# Patient Record
Sex: MALE
[De-identification: names, ages, dates, MRNs, and addresses within clinical notes are randomized per-mention and may not be internally consistent; named-entity substitution may affect disease eponyms.]

---

## 2018-03-19 ENCOUNTER — RESULT REVIEW (OUTPATIENT)
Age: 63
End: 2018-03-19

## 2020-09-10 ENCOUNTER — RESULT REVIEW (OUTPATIENT)
Age: 65
End: 2020-09-10

## 2022-12-06 PROBLEM — Z00.00 ENCOUNTER FOR PREVENTIVE HEALTH EXAMINATION: Status: ACTIVE | Noted: 2022-12-06

## 2022-12-08 ENCOUNTER — APPOINTMENT (OUTPATIENT)
Dept: SURGERY | Facility: CLINIC | Age: 67
End: 2022-12-08

## 2022-12-08 VITALS
SYSTOLIC BLOOD PRESSURE: 162 MMHG | HEIGHT: 70 IN | WEIGHT: 210 LBS | OXYGEN SATURATION: 97 % | TEMPERATURE: 98.1 F | DIASTOLIC BLOOD PRESSURE: 89 MMHG | HEART RATE: 72 BPM | BODY MASS INDEX: 30.06 KG/M2

## 2022-12-08 DIAGNOSIS — K43.9 VENTRAL HERNIA W/OUT OBSTRUCTION OR GANGRENE: ICD-10-CM

## 2022-12-08 PROCEDURE — 99024 POSTOP FOLLOW-UP VISIT: CPT

## 2022-12-08 NOTE — ASSESSMENT
[FreeTextEntry1] : Patient is a 56 yo M referred to clinic by his PCP for evaluation of potential right sided abdominal wall hernia.\par \par PLAN: CT Abdomen and Pelvis without contrast for evaluation of potential right sided abdominal wall hernia. Patient was instructed to call after imaging to discuss results.\par \par All patient questions were answered in detail and patient expressed understanding. Necessity of imaging for evaluation was discussed with the patient and his wife. Both patient and wife expressed understanding of the plan.

## 2022-12-08 NOTE — HISTORY OF PRESENT ILLNESS
[de-identified] : Patient is a 56yo M referred to the clinic by his PCP for potential abdominal wall hernia. His PCP noted the possible right side abdominal hernia in early September while the patient was laying down for an EKG. The patient denies any discomfort, pain, or changes to bowel movements. He denies any fever, nausea, or vomiting.

## 2022-12-08 NOTE — PHYSICAL EXAM
Likely cause of AMS, ammonia elevated on admission  GI recs for management/diuresis (on home lasix 120, spirinolactone 100)  US liver pending   [Alert] : alert [Oriented to Person] : oriented to person [Oriented to Place] : oriented to place [Oriented to Time] : oriented to time [Calm] : calm [de-identified] : NAD, comfortable, conversational. [de-identified] : Protuberant abdomen with diastasis. No tenderness.

## 2022-12-15 ENCOUNTER — RESULT REVIEW (OUTPATIENT)
Age: 67
End: 2022-12-15

## 2022-12-22 ENCOUNTER — OUTPATIENT (OUTPATIENT)
Dept: OUTPATIENT SERVICES | Facility: HOSPITAL | Age: 67
LOS: 1 days | End: 2022-12-22
Payer: MEDICARE

## 2022-12-22 ENCOUNTER — APPOINTMENT (OUTPATIENT)
Dept: CT IMAGING | Facility: IMAGING CENTER | Age: 67
End: 2022-12-22
Payer: MEDICARE

## 2022-12-22 DIAGNOSIS — K43.9 VENTRAL HERNIA WITHOUT OBSTRUCTION OR GANGRENE: ICD-10-CM

## 2022-12-22 PROCEDURE — 74176 CT ABD & PELVIS W/O CONTRAST: CPT | Mod: 26,MH

## 2022-12-22 PROCEDURE — 74176 CT ABD & PELVIS W/O CONTRAST: CPT

## 2022-12-29 ENCOUNTER — TRANSCRIPTION ENCOUNTER (OUTPATIENT)
Age: 67
End: 2022-12-29